# Patient Record
Sex: MALE | Race: WHITE | NOT HISPANIC OR LATINO | URBAN - METROPOLITAN AREA
[De-identification: names, ages, dates, MRNs, and addresses within clinical notes are randomized per-mention and may not be internally consistent; named-entity substitution may affect disease eponyms.]

---

## 2021-03-19 ENCOUNTER — EMERGENCY (EMERGENCY)
Facility: HOSPITAL | Age: 71
LOS: 0 days | Discharge: HOME | End: 2021-03-19
Attending: EMERGENCY MEDICINE | Admitting: EMERGENCY MEDICINE
Payer: COMMERCIAL

## 2021-03-19 VITALS
RESPIRATION RATE: 18 BRPM | TEMPERATURE: 97 F | OXYGEN SATURATION: 100 % | SYSTOLIC BLOOD PRESSURE: 174 MMHG | WEIGHT: 179.9 LBS | HEART RATE: 61 BPM | DIASTOLIC BLOOD PRESSURE: 77 MMHG

## 2021-03-19 DIAGNOSIS — H53.8 OTHER VISUAL DISTURBANCES: ICD-10-CM
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
## 2021-03-19 DIAGNOSIS — S05.02XA INJURY OF CONJUNCTIVA AND CORNEAL ABRASION WITHOUT FOREIGN BODY, LEFT EYE, INITIAL ENCOUNTER: ICD-10-CM
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
## 2021-03-19 PROCEDURE — 99283 EMERGENCY DEPT VISIT LOW MDM: CPT

## 2021-03-19 RX ORDER — FLUORESCEIN SODIUM 9 MG
1 STRIP OPHTHALMIC (EYE) ONCE
Refills: 0 | Status: COMPLETED | OUTPATIENT
Start: 2021-03-19 | End: 2021-03-19

## 2021-03-19 RX ORDER — GENTAMICIN SULFATE 3 MG/ML
1 SOLUTION/ DROPS OPHTHALMIC ONCE
Refills: 0 | Status: COMPLETED | OUTPATIENT
Start: 2021-03-19 | End: 2021-03-19

## 2021-03-19 RX ADMIN — Medication 1 DROP(S): at 22:53

## 2021-03-19 RX ADMIN — GENTAMICIN SULFATE 1 APPLICATION(S): 3 SOLUTION/ DROPS OPHTHALMIC at 22:53

## 2021-03-19 RX ADMIN — Medication 1 APPLICATION(S): at 22:52

## 2021-03-19 NOTE — ED PROVIDER NOTE - NSFOLLOWUPCLINICS_GEN_ALL_ED_FT
[facility]  Ophthalmolgy  [address], [address]  [place] [address]  Phone: [phone]  Fax:   Follow Up Time:

## 2021-03-19 NOTE — ED ADULT NURSE NOTE - NSIMPLEMENTINTERV_GEN_ALL_ED
Implemented All Universal Safety Interventions:  [place] to call system. Call bell, personal items and telephone within reach. Instruct patient to call for assistance. Room bathroom lighting operational. Non-slip footwear when patient is off stretcher. Physically safe environment: no spills, clutter or unnecessary equipment. Stretcher in lowest position, wheels locked, appropriate side rails in place.

## 2021-03-19 NOTE — ED PROVIDER NOTE - NSFOLLOWUPINSTRUCTIONS_ED_ALL_ED_FT
Please apply gentamicin ointment to your left eye 2-3x per day for 5 days. Please wash out your left eye a few times with the provided eye solution as needed for irritation. Please follow up with an ophthalmologist.    Additional educational information:  Conjunctivitis    Conjunctivitis is an inflammation of the clear membrane that covers the white part of your eye and the inner surface of your eyelid (conjunctiva). Symptoms include eye redness, eye pain, tearing and drainage, crusting of eyelids, swollen eyelids, and light sensitivity. Conjunctivitis may be contagious and easily spread from person to person. There are several causes of and treatment depends on the type of conjunctivitis that is suspected. Avoid touching or rubbing your eyes and wipe away any drainage gently with a warm wet washcloth. Do not wear contact lenses until the inflammation is gone – wear glasses until your health care provider says it is safe to wear contact again. Do not share towels or washcloths that may spread the infection and wash your hands frequently.    SEEK MEDICAL CARE IF YOU HAVE THE FOLLOWING SYMPTOMS: increasing pain, blurry vision, fever, facial pain/redness/swelling, worsening symptoms.

## 2021-03-19 NOTE — ED PROVIDER NOTE - PATIENT PORTAL LINK FT
You can access the FollowMyHealth Patient Portal offered by [facility] by registering at the following website: [email or web address]. By joining [employer]’s FollowMyHealth portal, you will also be able to view your health information using other applications (apps) compatible with our system.

## 2021-03-19 NOTE — ED ADULT TRIAGE NOTE - CHIEF COMPLAINT QUOTE
pt c/o blurred vision to left eye after splashing brake fluid in the left eye approx 1 hour ago. Pt states she rinsed eye out but still burning.  Pt hx cataract sx and lens implant to left eye.

## 2021-03-19 NOTE — ED PROVIDER NOTE - CLINICAL SUMMARY MEDICAL DECISION MAKING FREE TEXT BOX
71 yo M with left eye irritation after a small amt ob brake fluid dripped into his eye.  He  washed it out immediately wit water using a spray bottle, reports mild FB sensation and reported transiently blurry vision which has greatly improved.  PERRL, nml eyelids, pH measured in both eyes and same, eye was washed out and  stained, small abrasion inferiorly seen not in axis of vision, abx ophthalmic ointment given, advised to follow up with ophtho in 2-3 days, strict return precautions given.

## 2021-03-19 NOTE — ED PROVIDER NOTE - PROGRESS NOTE DETAILS
SL: Thorough eye exam performed with Dr. Ramirez supervising. Fluorescein stain test significant for small corneal abrasion of L conjunctiva. Eye was washed with Clear Eye solution. Pt given gentamicin ointment.  Full DC instructions discussed and patient knows when to seek immediate medical attention.  Patient has proper follow up.  All results discussed and patient aware they may require further work up.  Proper follow up ensured. Limitations of ED work up discussed.  Medications administered and prescribed/OTC home meds discussed.  All questions and concerns from patient or family addressed. Understanding of instructions verbalized.

## 2021-03-19 NOTE — ED PROVIDER NOTE - NS ED ROS FT
Constitutional: No altered mental status.  Eyes: +L eye blurry vision see HPI.  ENT: No hearing loss.  Neck: No neck pain or stiffness.  Cardiovascular: No chest pain, palpitations.  Pulmonary: No SOB. No hemoptysis.  Abdominal: No abdominal pain, nausea, vomiting.   : No hematuria.  Neuro: No headache, syncope, dizziness.  MS: No back pain. No deformities.  Psych: No suicidal ideations.
Airway patent, MMM, neck supple with full range of motion, no cervical adenopathy.

## 2021-03-19 NOTE — ED PROVIDER NOTE - PHYSICAL EXAMINATION
VITAL SIGNS: I have reviewed nursing notes and confirm.  CONSTITUTIONAL: well-appearing age-appropriate man sitting up in chair in NAD  SKIN: Warm dry, normal skin turgor  HEAD: NCAT  EYES: EOMI, PERRLA, no conjunctival injection. Visual acuity 20/20 bl but pt endorses blurriness with L eye.   ENT: Moist mucous membranes  NECK: Supple; non tender.   CARD: RRR, no murmurs, rubs or gallops  RESP: clear to ausculation b/l.  No rales, rhonchi, or wheezing.  ABD: soft, non-tender, non-distended, no rebound or guarding.  EXT: Full ROM, no bony tenderness, no pedal edema, no calf tenderness  NEURO: normal motor. normal sensory. Normal gait.  PSYCH: Cooperative, appropriate.

## 2021-03-19 NOTE — ED PROVIDER NOTE - OBJECTIVE STATEMENT
70M PMHx bilateral cataract surgery 10+ years ago presents to ED for blurry vision after splashing brake fluid in L eye at 9 PM tonight. Pt reports he was cleaning his car when a few drops of brake fluid splashed in his L eye (was not wearing eye protection). Immediate burning sensation and then sprayed his eye for 2-3 minutes using kitchen spray bottle filled with tap water. Reports resolution of burning sensation. Went to drive but noted his L eye vision to be blurrier than usual. Endorses sensation of something in his L eye. Denies eye pain, no R eye symptoms/involvement, no headache, nausea, vomiting, cp, sob, abd pain.

## 2021-03-19 NOTE — ED PROVIDER NOTE - ATTENDING CONTRIBUTION TO CARE
69 yo M with left eye irritation after a small amt ob brake fluid dripped into his eye.  He  washed it out immediately wit water using a spray bottle, reports mild FB sensation and reported transiently blurry vision which has greatly improved.  PERRL, nml eyelids, pH measured in both eyes and same, eye was washed out and  stained, small abrasion inferiorly seen not in axis of vision, abx ophthalmic ointment given, advised to follow up with ophtho in 2-3 days, strict return precautions given.

## 2022-07-21 PROBLEM — Z78.9 OTHER SPECIFIED HEALTH STATUS: Chronic | Status: ACTIVE | Noted: 2021-03-19

## 2022-07-21 PROBLEM — Z00.00 ENCOUNTER FOR PREVENTIVE HEALTH EXAMINATION: Status: ACTIVE | Noted: 2022-07-21

## 2022-07-25 ENCOUNTER — RESULT CHARGE (OUTPATIENT)
Age: 72
End: 2022-07-25

## 2022-07-25 ENCOUNTER — APPOINTMENT (OUTPATIENT)
Dept: CARDIOLOGY | Facility: CLINIC | Age: 72
End: 2022-07-25

## 2022-07-25 VITALS
HEIGHT: 72 IN | BODY MASS INDEX: 23.84 KG/M2 | WEIGHT: 176 LBS | SYSTOLIC BLOOD PRESSURE: 142 MMHG | DIASTOLIC BLOOD PRESSURE: 70 MMHG | HEART RATE: 68 BPM

## 2022-07-25 DIAGNOSIS — Z86.69 PERSONAL HISTORY OF OTHER DISEASES OF THE NERVOUS SYSTEM AND SENSE ORGANS: ICD-10-CM

## 2022-07-25 DIAGNOSIS — Z13.6 ENCOUNTER FOR SCREENING FOR CARDIOVASCULAR DISORDERS: ICD-10-CM

## 2022-07-25 DIAGNOSIS — R20.0 ANESTHESIA OF SKIN: ICD-10-CM

## 2022-07-25 DIAGNOSIS — Z86.39 PERSONAL HISTORY OF OTHER ENDOCRINE, NUTRITIONAL AND METABOLIC DISEASE: ICD-10-CM

## 2022-07-25 DIAGNOSIS — K21.9 GASTRO-ESOPHAGEAL REFLUX DISEASE W/OUT ESOPHAGITIS: ICD-10-CM

## 2022-07-25 DIAGNOSIS — I10 ESSENTIAL (PRIMARY) HYPERTENSION: ICD-10-CM

## 2022-07-25 DIAGNOSIS — Z78.9 OTHER SPECIFIED HEALTH STATUS: ICD-10-CM

## 2022-07-25 PROCEDURE — 99204 OFFICE O/P NEW MOD 45 MIN: CPT | Mod: 25

## 2022-07-25 PROCEDURE — 93000 ELECTROCARDIOGRAM COMPLETE: CPT

## 2022-07-25 RX ORDER — TAMSULOSIN HYDROCHLORIDE 0.4 MG/1
0.4 CAPSULE ORAL
Qty: 90 | Refills: 0 | Status: ACTIVE | COMMUNITY
Start: 2021-11-10

## 2022-07-25 RX ORDER — FAMOTIDINE 40 MG/1
40 TABLET, FILM COATED ORAL
Qty: 180 | Refills: 0 | Status: ACTIVE | COMMUNITY
Start: 2022-05-31

## 2022-07-25 NOTE — ASSESSMENT
[FreeTextEntry1] : 72 y/o male with history as above.\par No angina\par Labs reviewed - lipids well control.\par Elevated BP\par Discussed options.\par He will try life-style changes for 3 months and to monitor BP.\par Low salt diet\par If still elevated BP will add medical therapy. Will consider losartan 25-50 mg.\par Return in 3 months.

## 2022-07-25 NOTE — HISTORY OF PRESENT ILLNESS
[FreeTextEntry1] : Pt is 71 year old male.with PMH carpal tunnel syndrome,  B hands numbness, arthritis.   Pt is here to establish care.  As per pt he wishes cardiology evaluation due to age.   As per pt he had chest pain, dizziness  10 years ago had ER visit at [facility]  and had stress test at the time that was normal.  Pt was told he has borderline lipids pt started low fat diet.  As per pt his B/p has been elevated at home systolic in 150th.  Pt is very active throughout the day.  Pt is a arriaza. rides bicycle daily.  Pt denies chest pain, no SOB, no syncope

## 2024-02-08 ENCOUNTER — EMERGENCY (EMERGENCY)
Facility: HOSPITAL | Age: 74
LOS: 0 days | Discharge: HOME | End: 2024-02-08
Attending: STUDENT IN AN ORGANIZED HEALTH CARE EDUCATION/TRAINING PROGRAM
Payer: COMMERCIAL

## 2024-02-08 VITALS
WEIGHT: 175.05 LBS | SYSTOLIC BLOOD PRESSURE: 157 MMHG | TEMPERATURE: 98 F | DIASTOLIC BLOOD PRESSURE: 56 MMHG | HEART RATE: 50 BPM | RESPIRATION RATE: 18 BRPM | OXYGEN SATURATION: 99 %

## 2024-02-08 DIAGNOSIS — I10 ESSENTIAL (PRIMARY) HYPERTENSION: ICD-10-CM

## 2024-02-08 DIAGNOSIS — R42 DIZZINESS AND GIDDINESS: ICD-10-CM

## 2024-02-08 DIAGNOSIS — H93.19 TINNITUS, UNSPECIFIED EAR: ICD-10-CM

## 2024-02-08 DIAGNOSIS — Z95.5 PRESENCE OF CORONARY ANGIOPLASTY IMPLANT AND GRAFT: ICD-10-CM

## 2024-02-08 DIAGNOSIS — R00.1 BRADYCARDIA, UNSPECIFIED: ICD-10-CM

## 2024-02-08 DIAGNOSIS — I25.10 ATHEROSCLEROTIC HEART DISEASE OF NATIVE CORONARY ARTERY WITHOUT ANGINA PECTORIS: ICD-10-CM

## 2024-02-08 DIAGNOSIS — E78.5 HYPERLIPIDEMIA, UNSPECIFIED: ICD-10-CM

## 2024-02-08 LAB
ALBUMIN SERPL ELPH-MCNC: 4.4 G/DL — SIGNIFICANT CHANGE UP (ref 3.5–5.2)
ALP SERPL-CCNC: 71 U/L — SIGNIFICANT CHANGE UP (ref 30–115)
ALT FLD-CCNC: 30 U/L — SIGNIFICANT CHANGE UP (ref 0–41)
ANION GAP SERPL CALC-SCNC: 8 MMOL/L — SIGNIFICANT CHANGE UP (ref 7–14)
AST SERPL-CCNC: 25 U/L — SIGNIFICANT CHANGE UP (ref 0–41)
BASOPHILS # BLD AUTO: 0.03 K/UL — SIGNIFICANT CHANGE UP (ref 0–0.2)
BASOPHILS NFR BLD AUTO: 0.6 % — SIGNIFICANT CHANGE UP (ref 0–1)
BILIRUB SERPL-MCNC: 0.5 MG/DL — SIGNIFICANT CHANGE UP (ref 0.2–1.2)
BUN SERPL-MCNC: 30 MG/DL — HIGH (ref 10–20)
CALCIUM SERPL-MCNC: 9.5 MG/DL — SIGNIFICANT CHANGE UP (ref 8.4–10.5)
CHLORIDE SERPL-SCNC: 100 MMOL/L — SIGNIFICANT CHANGE UP (ref 98–110)
CO2 SERPL-SCNC: 25 MMOL/L — SIGNIFICANT CHANGE UP (ref 17–32)
CREAT SERPL-MCNC: 1 MG/DL — SIGNIFICANT CHANGE UP (ref 0.7–1.5)
EGFR: 79 ML/MIN/1.73M2 — SIGNIFICANT CHANGE UP
EOSINOPHIL # BLD AUTO: 0.44 K/UL — SIGNIFICANT CHANGE UP (ref 0–0.7)
EOSINOPHIL NFR BLD AUTO: 8.1 % — HIGH (ref 0–8)
GLUCOSE SERPL-MCNC: 124 MG/DL — HIGH (ref 70–99)
HCT VFR BLD CALC: 30.9 % — LOW (ref 42–52)
HGB BLD-MCNC: 10.4 G/DL — LOW (ref 14–18)
IMM GRANULOCYTES NFR BLD AUTO: 0.2 % — SIGNIFICANT CHANGE UP (ref 0.1–0.3)
LYMPHOCYTES # BLD AUTO: 0.96 K/UL — LOW (ref 1.2–3.4)
LYMPHOCYTES # BLD AUTO: 17.7 % — LOW (ref 20.5–51.1)
MCHC RBC-ENTMCNC: 31.2 PG — HIGH (ref 27–31)
MCHC RBC-ENTMCNC: 33.7 G/DL — SIGNIFICANT CHANGE UP (ref 32–37)
MCV RBC AUTO: 92.8 FL — SIGNIFICANT CHANGE UP (ref 80–94)
MONOCYTES # BLD AUTO: 0.51 K/UL — SIGNIFICANT CHANGE UP (ref 0.1–0.6)
MONOCYTES NFR BLD AUTO: 9.4 % — HIGH (ref 1.7–9.3)
NEUTROPHILS # BLD AUTO: 3.47 K/UL — SIGNIFICANT CHANGE UP (ref 1.4–6.5)
NEUTROPHILS NFR BLD AUTO: 64 % — SIGNIFICANT CHANGE UP (ref 42.2–75.2)
NRBC # BLD: 0 /100 WBCS — SIGNIFICANT CHANGE UP (ref 0–0)
PLATELET # BLD AUTO: 237 K/UL — SIGNIFICANT CHANGE UP (ref 130–400)
PMV BLD: 9.6 FL — SIGNIFICANT CHANGE UP (ref 7.4–10.4)
POTASSIUM SERPL-MCNC: 5.2 MMOL/L — HIGH (ref 3.5–5)
POTASSIUM SERPL-SCNC: 5.2 MMOL/L — HIGH (ref 3.5–5)
PROT SERPL-MCNC: 6.5 G/DL — SIGNIFICANT CHANGE UP (ref 6–8)
RBC # BLD: 3.33 M/UL — LOW (ref 4.7–6.1)
RBC # FLD: 12.2 % — SIGNIFICANT CHANGE UP (ref 11.5–14.5)
SODIUM SERPL-SCNC: 133 MMOL/L — LOW (ref 135–146)
WBC # BLD: 5.42 K/UL — SIGNIFICANT CHANGE UP (ref 4.8–10.8)
WBC # FLD AUTO: 5.42 K/UL — SIGNIFICANT CHANGE UP (ref 4.8–10.8)

## 2024-02-08 PROCEDURE — 70496 CT ANGIOGRAPHY HEAD: CPT | Mod: MA

## 2024-02-08 PROCEDURE — 96374 THER/PROPH/DIAG INJ IV PUSH: CPT | Mod: XU

## 2024-02-08 PROCEDURE — 80053 COMPREHEN METABOLIC PANEL: CPT

## 2024-02-08 PROCEDURE — 99285 EMERGENCY DEPT VISIT HI MDM: CPT

## 2024-02-08 PROCEDURE — 99285 EMERGENCY DEPT VISIT HI MDM: CPT | Mod: 25

## 2024-02-08 PROCEDURE — 70498 CT ANGIOGRAPHY NECK: CPT | Mod: 26,MA

## 2024-02-08 PROCEDURE — 82962 GLUCOSE BLOOD TEST: CPT

## 2024-02-08 PROCEDURE — 85025 COMPLETE CBC W/AUTO DIFF WBC: CPT

## 2024-02-08 PROCEDURE — 93010 ELECTROCARDIOGRAM REPORT: CPT

## 2024-02-08 PROCEDURE — 93005 ELECTROCARDIOGRAM TRACING: CPT

## 2024-02-08 PROCEDURE — 70450 CT HEAD/BRAIN W/O DYE: CPT | Mod: MA

## 2024-02-08 PROCEDURE — 70498 CT ANGIOGRAPHY NECK: CPT | Mod: MA

## 2024-02-08 PROCEDURE — 70496 CT ANGIOGRAPHY HEAD: CPT | Mod: 26,MA

## 2024-02-08 PROCEDURE — 36415 COLL VENOUS BLD VENIPUNCTURE: CPT

## 2024-02-08 PROCEDURE — 70450 CT HEAD/BRAIN W/O DYE: CPT | Mod: 26,MA,59

## 2024-02-08 RX ORDER — MECLIZINE HCL 12.5 MG
50 TABLET ORAL ONCE
Refills: 0 | Status: COMPLETED | OUTPATIENT
Start: 2024-02-08 | End: 2024-02-08

## 2024-02-08 RX ORDER — METOCLOPRAMIDE HCL 10 MG
10 TABLET ORAL ONCE
Refills: 0 | Status: COMPLETED | OUTPATIENT
Start: 2024-02-08 | End: 2024-02-08

## 2024-02-08 RX ORDER — MECLIZINE HCL 12.5 MG
1 TABLET ORAL
Qty: 20 | Refills: 0
Start: 2024-02-08 | End: 2024-02-12

## 2024-02-08 RX ORDER — SODIUM CHLORIDE 9 MG/ML
1000 INJECTION, SOLUTION INTRAVENOUS ONCE
Refills: 0 | Status: COMPLETED | OUTPATIENT
Start: 2024-02-08 | End: 2024-02-08

## 2024-02-08 RX ADMIN — Medication 50 MILLIGRAM(S): at 09:05

## 2024-02-08 RX ADMIN — Medication 104 MILLIGRAM(S): at 09:05

## 2024-02-08 RX ADMIN — SODIUM CHLORIDE 1000 MILLILITER(S): 9 INJECTION, SOLUTION INTRAVENOUS at 09:06

## 2024-02-08 NOTE — ED PROVIDER NOTE - PRIOR EKG STATUS
pt has outside EKG on his phone from [facility] which also showed sinus bradycardia rate of 40s

## 2024-02-08 NOTE — ED PROVIDER NOTE - NSFOLLOWUPINSTRUCTIONS_ED_ALL_ED_FT
Vertigo    Vertigo is the feeling that you or your surroundings are moving when they are not. Vertigo can be dangerous if it occurs while you are doing something that could endanger you or others, such as driving.    CAUSES  This condition is caused by a disturbance in the signals that are sent by your body's sensory systems to your brain. Different causes of a disturbance can lead to vertigo, including:    Infections, especially in the inner ear.  A bad reaction to a drug, or misuse of alcohol and medicines.  Withdrawal from drugs or alcohol.  Quickly changing positions, as when lying down or rolling over in bed.  Migraine headaches.  Decreased blood flow to the brain.  Decreased blood pressure.  Increased pressure in the brain from a head or neck injury, stroke, infection, tumor, or bleeding.  Central nervous system disorders.    SYMPTOMS  Symptoms of this condition usually occur when you move your head or your eyes in different directions. Symptoms may start suddenly, and they usually last for less than a minute. Symptoms may include:    Loss of balance and falling.  Feeling like you are spinning or moving.  Feeling like your surroundings are spinning or moving.  Nausea and vomiting.  Blurred vision or double vision.  Difficulty hearing.  Slurred speech.  Dizziness.  Involuntary eye movement (nystagmus).    Symptoms can be mild and cause only slight annoyance, or they can be severe and interfere with daily life. Episodes of vertigo may return (recur) over time, and they are often triggered by certain movements. Symptoms may improve over time.    DIAGNOSIS  This condition may be diagnosed based on medical history and the quality of your nystagmus. Your health care provider may test your eye movements by asking you to quickly change positions to trigger the nystagmus. This may be called the Nyssa-Hallpike test, head thrust test, or roll test. You may be referred to a health care provider who specializes in ear, nose, and throat (ENT) problems (otolaryngologist) or a provider who specializes in disorders of the central nervous system (neurologist).    You may have additional testing, including:    A physical exam.  Blood tests.  MRI.  A CT scan.  An electrocardiogram (ECG). This records electrical activity in your heart.  An electroencephalogram (EEG). This records electrical activity in your brain.  Hearing tests.    TREATMENT  Treatment for this condition depends on the cause and the severity of the symptoms. Treatment options include:    Medicines to treat nausea or vertigo. These are usually used for severe cases. Some medicines that are used to treat other conditions may also reduce or eliminate vertigo symptoms. These include:  Medicines that control allergies (antihistamines).  Medicines that control seizures (anticonvulsants).  Medicines that relieve depression (antidepressants).  Medicines that relieve anxiety (sedatives).  Head movements to adjust your inner ear back to normal. If your vertigo is caused by an ear problem, your health care provider may recommend certain movements to correct the problem.  Surgery. This is rare.    HOME CARE INSTRUCTIONS  Safety     Move slowly. Avoid sudden body or head movements.  Avoid driving.  Avoid operating heavy machinery.  Avoid doing any tasks that would cause danger to you or others if you would have a vertigo episode during the task.  If you have trouble walking or keeping your balance, try using a cane for stability. If you feel dizzy or unstable, sit down right away.  Return to your normal activities as told by your health care provider. Ask your health care provider what activities are safe for you.    General Instructions     Take over-the-counter and prescription medicines only as told by your health care provider.  Avoid certain positions or movements as told by your health care provider.  Drink enough fluid to keep your urine clear or pale yellow.  Keep all follow-up visits as told by your health care provider. This is important.    SEEK MEDICAL CARE IF:  Your medicines do not relieve your vertigo or they make it worse.  You have a fever.  Your condition gets worse or you develop new symptoms.  Your family or friends notice any behavioral changes.  Your nausea or vomiting gets worse.  You have numbness or a "pins and needles" sensation in part of your body.    SEEK IMMEDIATE MEDICAL CARE IF:  You have difficulty moving or speaking.  You are always dizzy.  You faint.  You develop severe headaches.  You have weakness in your hands, arms, or legs.  You have changes in your hearing or vision.  You develop a stiff neck.  You develop sensitivity to light.    ADDITIONAL NOTES AND INSTRUCTIONS    Please follow up with your Primary MD in 24-48 hr.  Seek immediate medical care for any new/worsening signs or symptoms.

## 2024-02-08 NOTE — ED PROVIDER NOTE - CLINICAL SUMMARY MEDICAL DECISION MAKING FREE TEXT BOX
74 y/o M h/o HTN, HLD, CAD s/p stents x2 p/w dizziness. Pt states that overnight he woke up to go to the bathroom and felt room spinning, unsteady gait. More severe than prior episodes which are usually self-limited. This time his sxs persisted. They improve when not moving, though don't entirely resolve, and worsen with positional changes. 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 No focal weakness/numbness/tingling, no facial droop, no slurred speech, no vision changes, no CP, no SOB, no bleeding events.     On exam, vitals reviewed. Agree with exam as documented by resident. Pt currently asymptomatic so I was unable to elicit any nystagmus on my exam though resident made note that earlier he noted some horizontal, unilateral, fatigable nystagmus. On my exam, CN II-XII intact, FNF intact, no pronator drift, negative romberg, 5/5 strength against resistance b/l UE and LE, normal sensation b/l UE and LE, normal pulses, normal gait unassisted.     Hgb at baseline, 10--pt already receiving outpt workup for anemia and shared his [facility] labs with me which confirm this is baseline. EKG is sinus martha which is his baseline per outside records at well. Low suspicion for ACS. CT imaging reviewed and unremarkable. D/w pt that CT/CTA cannot r/o cerebellar stroke. Shared decision-making was performed. Pt offered admission for Obs/MRI, but he elected for outpatient neurology follow-up.     labs and imaging reviewed with pt. given good instructions when to return to ED and importance of f/u.  all incidental findings were discussed with pt as well. pt verbalized understanding. patient was given opportunity to ask questions.

## 2024-02-08 NOTE — ED PROVIDER NOTE - PHYSICAL EXAMINATION
CONSTITUTIONAL: Well-developed; well-nourished; NAD  SKIN: warm, dry, w/o rash  HEAD: NCAT  EYES: PERRLA, EOMI, no conjunctival injection  ENT: No nasal discharge; nl OP without erythema or exudates  NECK: Supple, non-tender  CARD: nl S1, S2; RRR, no MRG, no JVD  RESP: CTAB, normal respiratory effort  ABD: BS+, soft, NTND, no HSM  EXT: Normal ROM.  No clubbing, cyanosis or edema  NEURO: Alert, oriented, grossly unremarkable; CN 2-12 grossly intact, no pronator drift, nl cerebellar exam, nl gait  PSYCH: Cooperative, appropriate

## 2024-02-08 NOTE — ED PROVIDER NOTE - PATIENT PORTAL LINK FT
You can access the FollowMyHealth Patient Portal offered by [facility] by registering at the following website: [email or web address]. By joining [employer]’s FollowMyHealth portal, you will also be able to view your health information using other applications (apps) compatible with our system. Xelbatshevaz Pregnancy And Lactation Text: This medication is Pregnancy Category D and is not considered safe during pregnancy.  The risk during breast feeding is also uncertain.

## 2024-02-08 NOTE — ED ADULT NURSE NOTE - NSFALLHARMRISKINTERV_ED_ALL_ED

## 2024-02-08 NOTE — ED ADULT TRIAGE NOTE - CHIEF COMPLAINT QUOTE
"I woke up a couple times during the night and I felt like my balance was off and I was dizzy but this has been going on for a couple months where the room just starts spinning" fs 114

## 2024-02-08 NOTE — ED PROVIDER NOTE - OBJECTIVE STATEMENT
Patient is a 73-year-old male with past medical history of hypertension, hyperlipidemia, CAD status post 2 stents around a year ago, follows with Jenelle from [facility] for cardiology presenting with dizziness.  Patient endorses history of vertiginous symptoms for many years, which occur with head movement, typically last about 10 minutes before resolving.  Overnight, patient says that he woke up to go to the bathroom and had sensation of room spinning, which is more severe than prior, it was difficult for him to walk.  Symptoms have persisted, worse with head movements.  Denies fever.  Endorses tinnitus at baseline, not worsened.  Denies focal numbness or weakness.  Denies chest pain.  Patient otherwise well

## 2024-02-26 NOTE — CHART NOTE - NSCHARTNOTEFT_GEN_A_CORE
[facility] MRN [id number] - emailed ENT 2/9, MV / Update inquired 2/16 - LW / Update inquired 2/23 - KERON / Appointment made - KERON    Specialty: ENT  Date: March 6, 2024  Time: 9:15 AM  Location: [address] [place] [address]

## 2024-03-06 ENCOUNTER — APPOINTMENT (OUTPATIENT)
Dept: OTOLARYNGOLOGY | Facility: CLINIC | Age: 74
End: 2024-03-06
Payer: COMMERCIAL

## 2024-03-06 ENCOUNTER — NON-APPOINTMENT (OUTPATIENT)
Age: 74
End: 2024-03-06

## 2024-03-06 VITALS — WEIGHT: 160 LBS | BODY MASS INDEX: 21.67 KG/M2 | HEIGHT: 72 IN

## 2024-03-06 DIAGNOSIS — H91.93 UNSPECIFIED HEARING LOSS, BILATERAL: ICD-10-CM

## 2024-03-06 PROCEDURE — 99204 OFFICE O/P NEW MOD 45 MIN: CPT

## 2024-03-06 PROCEDURE — 92550 TYMPANOMETRY & REFLEX THRESH: CPT | Mod: 52

## 2024-03-06 PROCEDURE — 92557 COMPREHENSIVE HEARING TEST: CPT

## 2024-03-06 NOTE — DATA REVIEWED
[de-identified] :  reviewed and interpreted by me. B/L SAVANNA [de-identified] : ACC: [id number] EXAM: CT ANGIO NECK (W)AW IC ORDERED BY: SRINIVASAN NELSON  ACC: [id number] EXAM: CT ANGIO BRAIN (W)AW IC ORDERED BY: SRINIVASAN NELSON  PROCEDURE DATE: 02/08/2024    INTERPRETATION: Clinical History / Reason for exam: Dizziness  Technique: CT angiogram of the head and neck. Contiguous CT axial images of the head and neck were obtained following the intravenous administration of 100 cc Omnipaque 350 (0 cc discarded) with coronal, sagittal and multiple 3-D MIP and volume rendered reformats.  Comparison: None  Findings:  NECK: The visualized aortic arch and great vessel origins are patent.  The common, internal and external carotid arteries are patent. Calcific plaque at the left carotid bifurcation without significant stenosis.  The vertebral arteries are patent. The left vertebral artery is dominant.  HEAD: The distal internal carotid arteries demonstrate minimal calcific plaque without significant stenosis. The anterior and middle cerebral arteries are patent.  The distal vertebral arteries are patent. The right vertebral artery terminates as the PICA, normal variation. The basilar artery is patent though small in caliber reflecting a dominant anterior circulation. The posterior cerebral arteries are patent and demonstrate near direct origins from the ICAs bilaterally, normal variation.  The dural venous sinuses are patent.  IMPRESSION:  No evidence of major vascular stenosis or occlusion.  --- End of Report ---      IMAN FALCON MD; Attending Radiologist This document has been electronically signed. Feb 8 2024 9:33AM

## 2024-03-06 NOTE — HISTORY OF PRESENT ILLNESS
[de-identified] : Patient presents today c/o dizziness. Woke up with Dizziness last week. Seen in ED and had CT performed on 2/8/24. He has been having dizziness in the morning daily when looking up. C/o of imbalance, room spinning for minutes, lightheadedness. Was given meclizine with some improvement.  History of head injury 15 years ago, that has caused tinnitus and hearing loss. Has an echo in ear. Will be going through worker's comp to cover hearing aids.  Wife present for visit.

## 2024-04-03 ENCOUNTER — APPOINTMENT (OUTPATIENT)
Dept: OTOLARYNGOLOGY | Facility: CLINIC | Age: 74
End: 2024-04-03
Payer: COMMERCIAL

## 2024-04-03 VITALS — HEIGHT: 72 IN | BODY MASS INDEX: 21.67 KG/M2 | WEIGHT: 160 LBS

## 2024-04-03 DIAGNOSIS — R42 DIZZINESS AND GIDDINESS: ICD-10-CM

## 2024-04-03 DIAGNOSIS — H93.13 TINNITUS, BILATERAL: ICD-10-CM

## 2024-04-03 DIAGNOSIS — H90.5 UNSPECIFIED SENSORINEURAL HEARING LOSS: ICD-10-CM

## 2024-04-03 PROCEDURE — 92537 CALORIC VSTBLR TEST W/REC: CPT

## 2024-04-03 PROCEDURE — 99214 OFFICE O/P EST MOD 30 MIN: CPT

## 2024-04-03 PROCEDURE — 92540 BASIC VESTIBULAR EVALUATION: CPT

## 2024-04-03 PROCEDURE — 92547 SUPPLEMENTAL ELECTRICAL TEST: CPT

## 2024-04-03 NOTE — DATA REVIEWED
[de-identified] : VNG 4/3/24 Nystagmus noted during positional testing is consistent with vestibular dysfunction that cannot be further localized. While right beating nystagmus is often seen with contralateral lesions consistent with left sided vestibular pathology, the presence of positional nystagmus beating primarily ipsilateral to the pathological side. Caloric testing revealed a right unilateral weakness. Nystagmus present in the spontaneous primary position down beating nystagmus and square wave jerks found in the Di Hallpike testing is consistent with central vestibular dysfunction.  [de-identified] : Test was reviewed  and interpreted by me. See official report below. [id number] EXAM: CT ANGIO NECK (W)AW IC ORDERED BY: SRINIVASAN NELSON  ACC: [id number] EXAM: CT ANGIO BRAIN (W)AW IC ORDERED BY: SRINIVASAN NELSON  PROCEDURE DATE: 02/08/2024    INTERPRETATION: Clinical History / Reason for exam: Dizziness  Technique: CT angiogram of the head and neck. Contiguous CT axial images of the head and neck were obtained following the intravenous administration of 100 cc Omnipaque 350 (0 cc discarded) with coronal, sagittal and multiple 3-D MIP and volume rendered reformats.  Comparison: None  Findings:  NECK: The visualized aortic arch and great vessel origins are patent.  The common, internal and external carotid arteries are patent. Calcific plaque at the left carotid bifurcation without significant stenosis.  The vertebral arteries are patent. The left vertebral artery is dominant.  HEAD: The distal internal carotid arteries demonstrate minimal calcific plaque without significant stenosis. The anterior and middle cerebral arteries are patent.  The distal vertebral arteries are patent. The right vertebral artery terminates as the PICA, normal variation. The basilar artery is patent though small in caliber reflecting a dominant anterior circulation. The posterior cerebral arteries are patent and demonstrate near direct origins from the ICAs bilaterally, normal variation.  The dural venous sinuses are patent.  IMPRESSION:  No evidence of major vascular stenosis or occlusion.

## 2024-04-03 NOTE — HISTORY OF PRESENT ILLNESS
[FreeTextEntry1] : Patient following up today on vertigo  and hearing loss, Patient states since last visit  he has been having no symptoms of vertigo.   He had a VNG test done today 04/03/2024 that was reviewed and showed deficit. Pt has occasional imbalance issues but overall pt has minimal dizziness symptoms. Wife present for visit.

## 2025-06-06 NOTE — ED PROVIDER NOTE - INTERNATIONAL TRAVEL
A stent catheter was inserted. Supply used: (SYSTEM COR STENT L12 MM L145 CM OD3 MM XIENCE [facility] - [id number]). No